# Patient Record
Sex: MALE | Race: WHITE | NOT HISPANIC OR LATINO | Employment: FULL TIME | URBAN - NONMETROPOLITAN AREA
[De-identification: names, ages, dates, MRNs, and addresses within clinical notes are randomized per-mention and may not be internally consistent; named-entity substitution may affect disease eponyms.]

---

## 2023-12-18 ENCOUNTER — TELEPHONE (OUTPATIENT)
Dept: PRIMARY CARE | Facility: CLINIC | Age: 48
End: 2023-12-18
Payer: COMMERCIAL

## 2023-12-18 NOTE — TELEPHONE ENCOUNTER
He is new patient to practice  Offer available provider seeing new patients  ( I saw him 2 times in last 13 years, last time >5 years ago)

## 2023-12-18 NOTE — TELEPHONE ENCOUNTER
Patient wanted to make an appointment with you for a physical and lab work. I advised him that I would make the appointment but you haven't seen him since [id number] so I needed to send you a message to ask if its ok to keep him on your schedule. If not the patient understands and is ok with seeing SVN as well. Please advise and I will let patient know.

## 2024-01-25 ENCOUNTER — APPOINTMENT (OUTPATIENT)
Dept: PRIMARY CARE | Facility: CLINIC | Age: 49
End: 2024-01-25
Payer: COMMERCIAL

## 2024-02-07 ENCOUNTER — APPOINTMENT (OUTPATIENT)
Dept: PRIMARY CARE | Facility: CLINIC | Age: 49
End: 2024-02-07
Payer: COMMERCIAL

## 2024-02-22 PROBLEM — D17.1 LIPOMA OF FLANK: Status: ACTIVE | Noted: 2024-02-22

## 2024-02-22 PROBLEM — J06.9 URI, ACUTE: Status: ACTIVE | Noted: 2024-02-22

## 2024-02-22 PROBLEM — R09.81 SINUS CONGESTION: Status: ACTIVE | Noted: 2024-02-22

## 2024-02-22 PROBLEM — J02.9 SORE THROAT: Status: ACTIVE | Noted: 2024-02-22

## 2024-02-26 ENCOUNTER — OFFICE VISIT (OUTPATIENT)
Dept: PRIMARY CARE | Facility: CLINIC | Age: 49
End: 2024-02-26
Payer: COMMERCIAL

## 2024-02-26 VITALS
HEIGHT: 76 IN | TEMPERATURE: 98 F | HEART RATE: 88 BPM | SYSTOLIC BLOOD PRESSURE: 131 MMHG | OXYGEN SATURATION: 97 % | WEIGHT: 214.5 LBS | DIASTOLIC BLOOD PRESSURE: 86 MMHG | BODY MASS INDEX: 26.12 KG/M2 | RESPIRATION RATE: 14 BRPM

## 2024-02-26 DIAGNOSIS — R01.1 HEART MURMUR: ICD-10-CM

## 2024-02-26 DIAGNOSIS — Z76.89 ENCOUNTER TO ESTABLISH CARE: Primary | ICD-10-CM

## 2024-02-26 DIAGNOSIS — E66.3 OVERWEIGHT WITH BODY MASS INDEX (BMI) OF 26 TO 26.9 IN ADULT: ICD-10-CM

## 2024-02-26 DIAGNOSIS — R03.0 ELEVATED BP WITHOUT DIAGNOSIS OF HYPERTENSION: ICD-10-CM

## 2024-02-26 DIAGNOSIS — Z12.5 SCREENING FOR PROSTATE CANCER: ICD-10-CM

## 2024-02-26 DIAGNOSIS — Z12.11 COLON CANCER SCREENING: ICD-10-CM

## 2024-02-26 DIAGNOSIS — Z00.00 HEALTHCARE MAINTENANCE: ICD-10-CM

## 2024-02-26 PROBLEM — J06.9 URI, ACUTE: Status: RESOLVED | Noted: 2024-02-22 | Resolved: 2024-02-26

## 2024-02-26 PROBLEM — J02.9 SORE THROAT: Status: RESOLVED | Noted: 2024-02-22 | Resolved: 2024-02-26

## 2024-02-26 PROBLEM — R09.81 SINUS CONGESTION: Status: RESOLVED | Noted: 2024-02-22 | Resolved: 2024-02-26

## 2024-02-26 PROCEDURE — 1036F TOBACCO NON-USER: CPT | Performed by: FAMILY MEDICINE

## 2024-02-26 PROCEDURE — 3008F BODY MASS INDEX DOCD: CPT | Performed by: FAMILY MEDICINE

## 2024-02-26 PROCEDURE — 99396 PREV VISIT EST AGE 40-64: CPT | Performed by: FAMILY MEDICINE

## 2024-02-26 NOTE — PROGRESS NOTES
Subjective   Patient ID: Clarence Florence is a 48 y.o. male who presents for New Patient Visit (Pt presents as a new pt to [facility] care- pt states no other concerns at this time.), Annual Exam (Pt presents as a new pt annual exam- ABN was given to pt and signed, pt verbalized understanding.  ), and Flu Vaccine (Pt declines flu vaccine at this time.  ).    HPI     NPV + CPE  Patient last seen in 2019  Patient here for re-establishment of care + annual physical.    Last Labs: 2018  Previously had DOT physicals    WELLNESS VISIT   TDAP: 10/2018  SHINGRIX: N/A  PNEUMOVAX: N/A  CSCOPE: none found  PSA: N/A  HEP C SCREEN: none found  CACS: none found    Patient declines influenza vaccine.    Medical history:  Denies history of hypertension, has never been on meds for blood pressure of cholesterol.    Father with hx of CAD s/p stents, first event in 60s  Mother with hx of HTN    Surgical history:  No pertinent history    Social history:     Health rating/assessment: good    Exercise: physical demanding  Diet: fair/balanced, has salad for lunch often    Alcohol use: social  Smoking: never smoker    Screenings:  Dental: UTD  Vision: UTD    Prostate cancer screening:  DUE after 4/15/2024  Denies family history in first degree relative.  Denies hesitancy, nocturia, or urgency.     Colon cancer screening: DUE  Denies family history in first degree relative.  Denies melena, hematochezia, constipation, diarrhea, bloating, change in bowel habits.    Thyroid disorder screening:   Denies family history in first degree relative.  Denies heat or cold intolerance, diarrhea or constipation, coarsening of hair, and palpitations.     Cardiac disorder screening:   Denies family history in first degree relative.  Denies chest pain, SOB, palpitations, edema, dizziness.      Reports job physical demanding, no MYERS or chest pressure on exertion.    Review of Systems   All other systems reviewed and are negative.      Objective   /86 (BP  "Location: Left arm, Patient Position: Sitting, BP Cuff Size: Adult)   Pulse 88   Temp 36.7 °C (98 °F) (Temporal)   Resp 14   Ht 1.918 m (6' 3.5\")   Wt 97.3 kg (214 lb 8 oz)   SpO2 97%   BMI 26.46 kg/m²     Physical Exam  Vitals and nursing note reviewed.   Constitutional:       General: He is not in acute distress.     Appearance: Normal appearance. He is not toxic-appearing.   HENT:      Head: Normocephalic and atraumatic.      Right Ear: Tympanic membrane normal.      Left Ear: Tympanic membrane normal.      Mouth/Throat:      Pharynx: Oropharynx is clear.      Comments: Low soft palate  Eyes:      Pupils: Pupils are equal, round, and reactive to light.   Neck:      Thyroid: No thyromegaly.      Vascular: No hepatojugular reflux or JVD.   Cardiovascular:      Rate and Rhythm: Normal rate and regular rhythm.      Heart sounds: Murmur (LT lower sternal border) heard.      Systolic murmur is present with a grade of 3/6.      No friction rub. No gallop.   Pulmonary:      Effort: Pulmonary effort is normal.      Breath sounds: Normal breath sounds. No wheezing, rhonchi or rales.   Abdominal:      General: Bowel sounds are normal. There is no distension.      Palpations: Abdomen is soft. There is no mass.      Tenderness: There is no abdominal tenderness. There is no guarding.   Musculoskeletal:      Right lower leg: No edema.      Left lower leg: No edema.      Comments: Varicosities BLE, right greater than left, but no edema.   Lymphadenopathy:      Cervical: No cervical adenopathy.   Skin:     General: Skin is warm and dry.   Neurological:      General: No focal deficit present.      Mental Status: He is alert and oriented to person, place, and time.      Gait: Gait normal.   Psychiatric:         Mood and Affect: Mood normal.         Behavior: Behavior normal.         Assessment/Plan   Problem List Items Addressed This Visit             ICD-10-CM    Healthcare maintenance Z00.00     Vaccines and screenings " reviewed.  Questionnaires completed.  Health and wellness topics reviewed.  Diet and exercise recommendations revisited.  Routine blood work ordered today.     VACCINES:  -Flu vaccine recommended, defers today  -TDAP is good until 2028  -Shingrix recommended for ages 50+  -Pneumonia vaccine recommended for ages 65+.    SCREENINGS:  -Screening PSA recommended for ages 50+, will be due after 4/15/2024, ordered today.  -Screening for colon cancer is due. The 3 Colon Cancer screening tests were reviewed with patient, FIT Test, Colonoscopy and Cologuard. Risks and benefits of each test were discussed. Patient has elected to undergo     LIFESTYLE MEASURES  -make sure you are avoiding refined carbs such as breads, pasta, cereal, candy, soda,  nutrition bars, granola, chips, and sugar sweetened beverages.      -eat 5- 7 servings daily of veggies,  healthy protein such as chicken, fish,  beans, and eggs, and include healthy fats in your diet such as seeds, nuts, olive oil, avocados, and salmon.   -exercise 4 - 6 days per week as you are able, 150 minutes total weekly divided up is recommended.  -Vitamin D is recommended at 1000 - 5000 IU international units daily.   -Always wear sunscreen when you have sun exposure.  -64 oz of water is recommended daily.  -Dental visits recommended every 6 months.  -Eye exam recommended every 2 years, for those with vision problems every year.           Heart murmur R01.1     3/6 systolic murmur LT upper sternal border  Mildly elevated BP but no dx HTN. VS otherwise stable. No CP, no MYERS, no edema.  Patient asymptomatic from cardiac stand point.    These can come and go based on several factors such as cardiac conditions, changes in BP, dehydration.     TTE (echocardiogram) ordered today, this is not emergent but do feel prudent you have this evaluation to further investigate murmur.         Elevated BP without diagnosis of hypertension R03.0     BP is 149/95 in office upon rooming today,  this is elevated.  Will have MA recheck his BP upon discharge today, see vitals section.  /86 on recheck - improved, see lifestyle recs below.    No prior hx of HTN, reports has been normal but required multiple checks on DOT physicals int he past.    Goal BP is 130/80 or less, ideally 120/80 or less.     Patient is encouraged to continue low sodium diet (Dietary Approach to Stop Hypertension, or DASH diet) .   Exercise most days of the week.   Limit refined carbohydrates such as pretzels, cereals, breads, pastries, alcohol.    If patient wishes to try a natural approach to lowering blood pressure, can consider:  -whey protein 20 -30 g daily (hydrolyzed is best, but any is ok)  -aged garlic 600 mg twice daily (Kyolic brand aged garlic on line is one example)   -CoQ10 supplement at 120 mg - 360 mg daily (average dose studied 225mg daily).     Patient to check BP regularly at home and keep a diary.  This should be taken after sitting with feet flat on floor and resting for 5 minutes.   Arm should be level with your heart.   New guidelines recommend goal for blood pressure less than 130/80.   Ideally for stroke and heart attack risk reduction the systolic, or top, blood pressure number should be in the 110's or 120's.    PLEASE BRING BP CUFF IN TO NEXT VISIT FOR VALIDATION - TAKE YOUR BP @ HOME BEFORE YOU COME!           Other Visit Diagnoses         Codes    Encounter to establish care    -  Primary Z76.89    Screening for prostate cancer     Z12.5    Overweight with body mass index (BMI) of 26 to 26.9 in adult     E66.3, Z68.26    Colon cancer screening     Z12.11            Follow-up in April/May 2024 for recheck blood pressure, murmur, lab review.  Fasting labs to be done prior (waiting until April 2024 for PSA to be covered).    Call for sooner follow-up if needed.     Scribe Attestation  By signing my name below, IErika Scribe   attest that this documentation has been prepared under the  direction and in the presence of Roselia Redding DO.

## 2024-02-26 NOTE — ASSESSMENT & PLAN NOTE
Vaccines and screenings reviewed.  Questionnaires completed.  Health and wellness topics reviewed.  Diet and exercise recommendations revisited.  Routine blood work ordered today.     VACCINES:  -Flu vaccine recommended, defers today  -TDAP is good until 2028  -Shingrix recommended for ages 50+  -Pneumonia vaccine recommended for ages 65+.    SCREENINGS:  -Screening PSA recommended for ages 50+, will be due after 4/15/2024, ordered today.  -Screening for colon cancer is due. The 3 Colon Cancer screening tests were reviewed with patient, FIT Test, Colonoscopy and Cologuard. Risks and benefits of each test were discussed. Patient has elected to undergo     LIFESTYLE MEASURES  -make sure you are avoiding refined carbs such as breads, pasta, cereal, candy, soda,  nutrition bars, granola, chips, and sugar sweetened beverages.      -eat 5- 7 servings daily of veggies,  healthy protein such as chicken, fish,  beans, and eggs, and include healthy fats in your diet such as seeds, nuts, olive oil, avocados, and salmon.   -exercise 4 - 6 days per week as you are able, 150 minutes total weekly divided up is recommended.  -Vitamin D is recommended at 1000 - 5000 IU international units daily.   -Always wear sunscreen when you have sun exposure.  -64 oz of water is recommended daily.  -Dental visits recommended every 6 months.  -Eye exam recommended every 2 years, for those with vision problems every year.

## 2024-02-26 NOTE — PATIENT INSTRUCTIONS
Healthcare maintenance  Vaccines and screenings reviewed.  Questionnaires completed.  Health and wellness topics reviewed.  Diet and exercise recommendations revisited.  Routine blood work ordered today.     VACCINES:  -Flu vaccine recommended, defers today  -TDAP is good until 2028  -Shingrix recommended for ages 50+  -Pneumonia vaccine recommended for ages 65+.    SCREENINGS:  -Screening PSA recommended for ages 50+, will be due after 4/15/2024, ordered today.  -Screening for colon cancer is due. The 3 Colon Cancer screening tests were reviewed with patient, FIT Test, Colonoscopy and Cologuard. Risks and benefits of each test were discussed. Patient has elected to undergo     LIFESTYLE MEASURES  -make sure you are avoiding refined carbs such as breads, pasta, cereal, candy, soda,  nutrition bars, granola, chips, and sugar sweetened beverages.      -eat 5- 7 servings daily of veggies,  healthy protein such as chicken, fish,  beans, and eggs, and include healthy fats in your diet such as seeds, nuts, olive oil, avocados, and salmon.   -exercise 4 - 6 days per week as you are able, 150 minutes total weekly divided up is recommended.  -Vitamin D is recommended at 1000 - 5000 IU international units daily.   -Always wear sunscreen when you have sun exposure.  -64 oz of water is recommended daily.  -Dental visits recommended every 6 months.  -Eye exam recommended every 2 years, for those with vision problems every year.      Heart murmur  3/6 systolic murmur LT upper sternal border  Mildly elevated BP but no dx HTN. VS otherwise stable. No CP, no MYERS, no edema.  Patient asymptomatic from cardiac stand point.    These can come and go based on several factors such as cardiac conditions, changes in BP, dehydration.     TTE (echocardiogram) ordered today, this is not emergent but do feel prudent you have this evaluation to further investigate murmur.    Elevated BP without diagnosis of hypertension  BP is 149/95 in office  upon rooming today, this is elevated.  Will have MA recheck his BP upon discharge today, see vitals section.  /86 on recheck - improved, see lifestyle recs below.    No prior hx of HTN, reports has been normal but required multiple checks on DOT physicals int he past.    Goal BP is 130/80 or less, ideally 120/80 or less.     Patient is encouraged to continue low sodium diet (Dietary Approach to Stop Hypertension, or DASH diet) .   Exercise most days of the week.   Limit refined carbohydrates such as pretzels, cereals, breads, pastries, alcohol.    If patient wishes to try a natural approach to lowering blood pressure, can consider:  -whey protein 20 -30 g daily (hydrolyzed is best, but any is ok)  -aged garlic 600 mg twice daily (Kyolic brand aged garlic on line is one example)   -CoQ10 supplement at 120 mg - 360 mg daily (average dose studied 225mg daily).     Patient to check BP regularly at home and keep a diary.  This should be taken after sitting with feet flat on floor and resting for 5 minutes.   Arm should be level with your heart.   New guidelines recommend goal for blood pressure less than 130/80.   Ideally for stroke and heart attack risk reduction the systolic, or top, blood pressure number should be in the 110's or 120's.    PLEASE BRING BP CUFF IN TO NEXT VISIT FOR VALIDATION - TAKE YOUR BP @ HOME BEFORE YOU COME!

## 2024-02-26 NOTE — ASSESSMENT & PLAN NOTE
3/6 systolic murmur LT upper sternal border  Mildly elevated BP but no dx HTN. VS otherwise stable. No CP, no MYERS, no edema.  Patient asymptomatic from cardiac stand point.    These can come and go based on several factors such as cardiac conditions, changes in BP, dehydration.     TTE (echocardiogram) ordered today, this is not emergent but do feel prudent you have this evaluation to further investigate murmur.

## 2024-04-24 ENCOUNTER — APPOINTMENT (OUTPATIENT)
Dept: PRIMARY CARE | Facility: CLINIC | Age: 49
End: 2024-04-24
Payer: COMMERCIAL

## 2024-04-29 ENCOUNTER — APPOINTMENT (OUTPATIENT)
Dept: PRIMARY CARE | Facility: CLINIC | Age: 49
End: 2024-04-29
Payer: COMMERCIAL

## 2024-07-25 ENCOUNTER — APPOINTMENT (OUTPATIENT)
Dept: PRIMARY CARE | Facility: CLINIC | Age: 49
End: 2024-07-25
Payer: COMMERCIAL

## 2024-10-04 ENCOUNTER — OFFICE VISIT (OUTPATIENT)
Dept: URGENT CARE | Age: 49
End: 2024-10-04

## 2024-10-04 DIAGNOSIS — Z02.4 ENCOUNTER FOR DEPARTMENT OF TRANSPORTATION (DOT) EXAMINATION FOR DRIVING LICENSE RENEWAL: Primary | ICD-10-CM

## 2024-10-10 ENCOUNTER — APPOINTMENT (OUTPATIENT)
Dept: PRIMARY CARE | Facility: CLINIC | Age: 49
End: 2024-10-10
Payer: COMMERCIAL

## 2025-02-21 ENCOUNTER — APPOINTMENT (OUTPATIENT)
Dept: PRIMARY CARE | Facility: CLINIC | Age: 50
End: 2025-02-21
Payer: COMMERCIAL

## 2025-10-23 ENCOUNTER — APPOINTMENT (OUTPATIENT)
Dept: PRIMARY CARE | Facility: CLINIC | Age: 50
End: 2025-10-23
Payer: COMMERCIAL